# Patient Record
Sex: FEMALE | Race: WHITE
[De-identification: names, ages, dates, MRNs, and addresses within clinical notes are randomized per-mention and may not be internally consistent; named-entity substitution may affect disease eponyms.]

---

## 2018-12-21 ENCOUNTER — HOSPITAL ENCOUNTER (EMERGENCY)
Dept: HOSPITAL 59 - ER | Age: 63
Discharge: HOME | End: 2018-12-21
Payer: COMMERCIAL

## 2018-12-21 DIAGNOSIS — R11.2: Primary | ICD-10-CM

## 2018-12-21 DIAGNOSIS — R19.7: ICD-10-CM

## 2018-12-21 LAB
ALBUMIN SERPL-MCNC: 4.2 G/DL (ref 4–5)
ALBUMIN/GLOB SERPL: 1.6 {RATIO} (ref 1.1–1.8)
ALP SERPL-CCNC: 130 U/L (ref 35–104)
ALT SERPL-CCNC: 25 U/L (ref ?–33)
ANION GAP SERPL CALC-SCNC: 13 MMOL/L (ref 7–16)
AST SERPL-CCNC: 46 U/L (ref 10–35)
BASOPHILS NFR BLD: 0 % (ref 0–6)
BILIRUB SERPL-MCNC: 0.4 MG/DL (ref 0.2–1)
BUN SERPL-MCNC: 7 MG/DL (ref 8–23)
CO2 SERPL-SCNC: 27 MMOL/L (ref 22–29)
CREAT SERPL-MCNC: 0.7 MG/DL (ref 0.5–0.9)
EOSINOPHIL NFR BLD: 0 % (ref 0–6)
ERYTHROCYTE [DISTWIDTH] IN BLOOD BY AUTOMATED COUNT: 13.8 % (ref 11.5–14.5)
EST GLOMERULAR FILTRATION RATE: > 60 ML/MIN
GLOBULIN SER-MCNC: 2.6 GM/DL (ref 1.4–4.8)
GLUCOSE SERPL-MCNC: 99 MG/DL (ref 74–109)
HCT VFR BLD CALC: 41.5 % (ref 35–47)
HGB BLD-MCNC: 13.4 GM/DL (ref 11.6–16)
LIPASE SERPL-CCNC: 45 U/L (ref 13–60)
LYMPHOCYTES NFR BLD: 12 % (ref 16–45)
MCH RBC QN AUTO: 28.3 PG (ref 27–33)
MCHC RBC AUTO-ENTMCNC: 32.3 G/DL (ref 32–36)
MCV RBC AUTO: 87.7 FL (ref 81–97)
MONOCYTES NFR BLD: 2 % (ref 0–9)
NEUTROPHILS NFR BLD AUTO: 86 % (ref 47–80)
NEUTS BAND NFR BLD: 0 % (ref 0–5)
PLATELET # BLD: 221 K/UL (ref 130–400)
PMV BLD AUTO: 10 FL (ref 7.4–10.4)
PROT SERPL-MCNC: 6.8 G/DL (ref 6.6–8.7)
RBC # BLD AUTO: 4.73 M/UL (ref 3.8–5.4)
WBC # BLD AUTO: 7.6 K/UL (ref 4.2–12.2)

## 2018-12-21 PROCEDURE — 96374 THER/PROPH/DIAG INJ IV PUSH: CPT

## 2018-12-21 PROCEDURE — 96361 HYDRATE IV INFUSION ADD-ON: CPT

## 2018-12-21 PROCEDURE — 99284 EMERGENCY DEPT VISIT MOD MDM: CPT

## 2018-12-21 PROCEDURE — 83690 ASSAY OF LIPASE: CPT

## 2018-12-21 PROCEDURE — 80053 COMPREHEN METABOLIC PANEL: CPT

## 2018-12-21 PROCEDURE — 96376 TX/PRO/DX INJ SAME DRUG ADON: CPT

## 2018-12-21 PROCEDURE — 85027 COMPLETE CBC AUTOMATED: CPT

## 2018-12-21 NOTE — EMERGENCY DEPARTMENT RECORD
History of Present Illness





- General


Chief complaint: Nausea, Vomiting, Diarrhea


Stated complaint: N/V/D


Time Seen by Provider: 18 16:42


Source: Patient, Family


Mode of Arrival: Ambulatory


Limitations: No limitations





- History of Present Illness


Initial comments: 


64 yo female presents with 14 hours of diarrhea, nausea and vomiting.  The 

onset was diarrhea at 3am.  She reports numerous non bloody stools.  She took 

Immodium with some slowing of the diarrhea.  She became nauseated and began 

vomiting.  No blood in the emesis.  She has tried small amounts of fluid 

throughout the day without much success.  Two weeks ago she was on antibiotics 

for a UTI.  She was asymptomatic the last several days until 3am.  She has a 

history of gastric bypass and cholecystectomy.  Dr Avendano is her PCP.  





MD complaint: Diarrhea, Nausea, Vomiting


Onset/Timin


-: Hour(s) (14)


Description of Vomiting: Bilious


Description of Diarrhea: Water


Associated Abdominal Pain: No


Improves with: None


Worsens with: None


Associated Symptoms: Fever/chills, Nausea/vomiting





- Related Data


 Previous Rx's











 Medication  Instructions  Recorded


 


Ondansetron [Zofran Odt] 4 mg PO Q8H #20 tab.rapdis 18











 Allergies











Allergy/AdvReac Type Severity Reaction Status Date / Time


 


erythromycin base AdvReac  NAUSEA Verified 16 01:09


 


hydrocodone AdvReac  NAUSEA Verified 18 22:56














Travel Screening





- Travel/Exposure Within Last 30 Days


Have you traveled within the last 30 days?: No





- Travel/Exposure Within Last Year


Have you traveled outside the [place] in the last year?: No





- Additonal Travel Details


Have you been exposed to anyone with a communicable illness?: No





- Travel Symptoms


Symptom Screening: None





Review of Systems


Constitutional: Reports: Chills, Malaise, Weakness


Eyes: Denies: Eye discharge, Eye pain, Vision change


ENT: Denies: Congestion, Throat pain


Respiratory: Denies: Cough, Dyspnea


Cardiovascular: Denies: Chest pain, Palpitations, Syncope


Endocrine: Reports: Fatigue


Gastrointestinal: Reports: Diarrhea, Nausea, Vomiting


Genitourinary: Denies: Dysuria


Musculoskeletal: Denies: Arthralgia, Back pain, Myalgia


Skin: Denies: Bruising, Change in color, Rash


Neurological: Denies: Confusion, Headache


Psychiatric: Denies: Anxiety


Hematological/Lymphatic: Denies: Blood Clots, Easy bleeding, Easy bruising, 

Swollen glands





Past Medical History





- SOCIAL HISTORY


Smoking Status: Never smoker


Alcohol Use: None


Drug Use: None





- RESPIRATORY


Hx Respiratory Disorders: No





- CARDIOVASCULAR


Hx Cardio Disorders: Yes


Hx Hypotension: Yes (orthostatic)





- NEURO


Hx Neuro Disorders: No


Comment:: Raynaud's; Sjogren's syndromew/keratoconjunctivitis sicca





- GI


Hx GI Disorders: Yes


Hx Ulcer: Yes (hx of duodenol)





- 


Hx Genitourinary Disorders: No


Hx Bladder Problem: Yes (OAB)





- ENDOCRINE


Hx Endocrine Disorders: Yes


Hx Thyroid Disease: Yes (hypothyroid)





- MUSCULOSKELETAL


Hx Musculoskeletal Disorders: No


Hx Arthritis: Yes (Osteo)


Comment:: Plantar fasciitis; Hyperkeratosis





- PSYCH


Hx Psych Problems: No





- HEMATOLOGY/ONCOLOGY


Hx Hematology/Oncology Disorders: No





Family Medical History


Any Significant Family History?: Yes


Hx Cancer: Father


Hx Heart Disease: Mother, Brother/Sister





Physical Exam





- General


General Appearance: Alert, Oriented x3, Cooperative, No acute distress


Limitations: No limitations





- Head


Head exam: Atraumatic, Normal inspection





- Eye


Eye exam: Normal appearance, PERRL.  negative: Conjunctival injection, Scleral 

icterus





- ENT


ENT exam: Normal exam, Mucous membranes moist


Ear exam: Normal external inspection


Nasal Exam: Normal inspection


Mouth exam: Normal external inspection





- Neck


Neck exam: Normal inspection





- Respiratory


Respiratory exam: Normal lung sounds bilaterally.  negative: Respiratory 

distress





- Cardiovascular


Cardiovascular Exam: Regular rate, Normal rhythm, Normal heart sounds





- GI/Abdominal


GI/Abdominal exam: Soft, Normal bowel sounds, Other (soft flat abdomen).  

negative: Distended, Guarding, Rebound, Rigid, Tenderness





- Rectal


Rectal exam: Deferred





- 


 exam: Deferred





- Extremities


Extremities exam: Normal inspection





- Back


Back exam: Denies: CVA tenderness (R), CVA tenderness (L)





- Neurological


Neurological exam: Alert, Oriented X3





- Psychiatric


Psychiatric exam: Normal affect, Normal mood.  negative: Agitated, Anxious





- Skin


Skin exam: Dry, Intact, Normal color, Warm





Course





 Vital Signs











  18





  16:29


 


Temperature 99.2 F


 


Pulse Rate 89


 


Respiratory 20





Rate 


 


Blood Pressure 145/79


 


Pulse Ox 98














- Reevaluation(s)


Reevaluation #1: 


Vitals reviewed.  No significant abnormalities.


18 16:54





18 17:29


The labs were reviewed.  No acute changes on the CBC or CMP


18 18:01


No vomiting or diarrhea to this point.  The patient is resting comfortably 

getting IVF.


18 18:53


Doing well at DC with no vomiting or diarrhea


We discussed home care, reasons to return, and outpatient lab slip for stool 

studies was provided if diarrhea continues


At DC no vomiting, diarrhea, fever, abdominal pain.











Medical Decision Making





- Lab Data


Result diagrams: 


 18 17:00





 18 17:00





Disposition


Disposition: Discharge


Clinical Impression: 


 Nausea vomiting and diarrhea





Disposition: Home, Self-Care


Condition: (1) Good


Instructions:  Acute Nausea and Vomiting (ED), Acute Diarrhea (ED)


Additional Instructions: 


Return if you have a return of uncontrolled vomiting or diarrhea, fever, 

abdominal pain or new concerns


Take the Zofran every 4-6 hours if needed for nausea


Goltry diet the next 2 days


Attempt to stay hydrated with small frequent amounts of fluids


Prescriptions: 


Ondansetron [Zofran Odt] 4 mg PO Q8H #20 tab.rapdis


Forms:  Patient Portal Access


Time of Disposition: 18:53





Quality





- Quality Measures


Quality Measures: N/A





- Blood Pressure Screening


Does Patient Have Any of the Following: No


Blood Pressure Classification: Hypertensive Reading


Systolic Measurement: 145


Diastolic Measurement: 79


Screening for High Blood Pressure: < Pre-Hypertensive BP, F/U Documented > [

]


Pre-Hypertensive Follow-up Interventions: Referral to alternative/primary care 

provider.

## 2018-12-23 ENCOUNTER — HOSPITAL ENCOUNTER (EMERGENCY)
Dept: HOSPITAL 59 - ER | Age: 63
Discharge: ANOTHER HOSPITAL | End: 2018-12-23
Payer: COMMERCIAL

## 2018-12-23 DIAGNOSIS — R19.7: ICD-10-CM

## 2018-12-23 DIAGNOSIS — R11.2: Primary | ICD-10-CM

## 2018-12-23 DIAGNOSIS — Z98.84: ICD-10-CM

## 2018-12-23 LAB
ALBUMIN SERPL-MCNC: 3.5 G/DL (ref 4–5)
ALBUMIN/GLOB SERPL: 1.3 {RATIO} (ref 1.1–1.8)
ALP SERPL-CCNC: 160 U/L (ref 35–104)
ALT SERPL-CCNC: 32 U/L (ref ?–33)
ANION GAP SERPL CALC-SCNC: 13 MMOL/L (ref 7–16)
APPEARANCE UR: CLEAR
AST SERPL-CCNC: 34 U/L (ref 10–35)
BASOPHILS NFR BLD: 0.8 % (ref 0–6)
BILIRUB SERPL-MCNC: 0.4 MG/DL (ref 0.2–1)
BILIRUB UR-MCNC: NEGATIVE MG/DL
BUN SERPL-MCNC: 6 MG/DL (ref 8–23)
CO2 SERPL-SCNC: 26 MMOL/L (ref 22–29)
COLOR UR: YELLOW
CREAT SERPL-MCNC: 0.5 MG/DL (ref 0.5–0.9)
EOSINOPHIL NFR BLD: 3.7 % (ref 0–6)
ERYTHROCYTE [DISTWIDTH] IN BLOOD BY AUTOMATED COUNT: 13.8 % (ref 11.5–14.5)
EST GLOMERULAR FILTRATION RATE: > 60 ML/MIN
GLOBULIN SER-MCNC: 2.6 GM/DL (ref 1.4–4.8)
GLUCOSE SERPL-MCNC: 105 MG/DL (ref 74–109)
GLUCOSE UR STRIP-MCNC: NEGATIVE MG/DL
GRANULOCYTES NFR BLD: 64.2 % (ref 47–80)
HCT VFR BLD CALC: 39.2 % (ref 35–47)
HGB BLD-MCNC: 12.7 GM/DL (ref 11.6–16)
KETONES UR QL STRIP: (no result)
LIPASE SERPL-CCNC: 20 U/L (ref 13–60)
LYMPHOCYTES NFR BLD AUTO: 17 % (ref 16–45)
MCH RBC QN AUTO: 27.9 PG (ref 27–33)
MCHC RBC AUTO-ENTMCNC: 32.4 G/DL (ref 32–36)
MCV RBC AUTO: 86.2 FL (ref 81–97)
MONOCYTES NFR BLD: 14.3 % (ref 0–9)
NITRITE UR QL STRIP: NEGATIVE
PLATELET # BLD: 217 K/UL (ref 130–400)
PMV BLD AUTO: 9.9 FL (ref 7.4–10.4)
PROT SERPL-MCNC: 6.1 G/DL (ref 6.6–8.7)
PROT UR QL STRIP: NEGATIVE
RBC # BLD AUTO: 4.55 M/UL (ref 3.8–5.4)
RBC # UR STRIP: (no result) /UL
URINE LEUKOCYTE ESTERASE: NEGATIVE
UROBILINOGEN UR STRIP-ACNC: 0.2 E.U./DL (ref 0.2–1)
WBC # BLD AUTO: 4.8 K/UL (ref 4.2–12.2)

## 2018-12-23 PROCEDURE — 96361 HYDRATE IV INFUSION ADD-ON: CPT

## 2018-12-23 PROCEDURE — 80053 COMPREHEN METABOLIC PANEL: CPT

## 2018-12-23 PROCEDURE — 86140 C-REACTIVE PROTEIN: CPT

## 2018-12-23 PROCEDURE — 85025 COMPLETE CBC W/AUTO DIFF WBC: CPT

## 2018-12-23 PROCEDURE — 96374 THER/PROPH/DIAG INJ IV PUSH: CPT

## 2018-12-23 PROCEDURE — 81003 URINALYSIS AUTO W/O SCOPE: CPT

## 2018-12-23 PROCEDURE — 83690 ASSAY OF LIPASE: CPT

## 2018-12-23 PROCEDURE — 99285 EMERGENCY DEPT VISIT HI MDM: CPT

## 2018-12-23 NOTE — EMERGENCY DEPARTMENT RECORD
History of Present Illness





- General


Chief complaint: Nausea, Vomiting, Diarrhea


Stated complaint: VOMITING,DIARRHEA


Time Seen by Provider: 12/23/18 20:45


Source: Patient, Family


Mode of Arrival: Ambulatory


Limitations: No limitations





- History of Present Illness


Initial comments: 


The patient is here due to a 3 day hx of frequent nausea, vomiting and loose 

watery stools. She denies any AP but is having mild abdominal cramping. The 

patient denies any blood in the vomit or stool but she did have a low grade 

fever tonight. She was in the ER 2 days ago for the same issues and did have 

stool studies yesterday which were neg. The patient has had gastric bypass 12 

years ago.





MD complaint: Diarrhea, Nausea, Vomiting


Onset/Timing: 3


-: Days(s)


Description of Diarrhea: Water


Associated Abdominal Pain: Yes


Location: Diffuse


Severity scale (1-10): 4


Quality: Aching, Cramping


Consistency: Constant


Worsens with: Vomiting


Associated Symptoms: Denies other symptoms





- Related Data


 Previous Rx's











 Medication  Instructions  Recorded


 


Ondansetron [Zofran Odt] 4 mg PO Q8H #20 tab.rapdis 12/21/18











 Allergies











Allergy/AdvReac Type Severity Reaction Status Date / Time


 


erythromycin base AdvReac  NAUSEA Verified 12/03/16 01:09


 


hydrocodone AdvReac  NAUSEA Verified 04/04/18 22:56














Travel Screening





- Travel/Exposure Within Last 30 Days


Have you traveled within the last 30 days?: No





- Travel Symptoms


Symptom Screening: None





Review of Systems


Constitutional: Denies: Chills, Fever


Eyes: Denies: Eye discharge


ENT: Denies: Congestion


Respiratory: Denies: Cough, Dyspnea


Cardiovascular: Denies: Arrhythmia


Endocrine: Denies: Fatigue


Gastrointestinal: Reports: Diarrhea, Nausea, Vomiting


Genitourinary: Denies: Dysuria


Musculoskeletal: Denies: Arthralgia


Skin: Denies: Bruising





Past Medical History





- SOCIAL HISTORY


Smoking Status: Never smoker





- RESPIRATORY


Hx Respiratory Disorders: No





- CARDIOVASCULAR


Hx Cardio Disorders: Yes


Hx Hypotension: Yes (orthostatic)





- NEURO


Hx Neuro Disorders: Yes


Comment:: Raynaud's; Sjogren's syndromew/keratoconjunctivitis sicca





- GI


Hx GI Disorders: Yes


Hx Reflux: Yes


Hx Ulcer: Yes (hx of duodenol)





- 


Hx Genitourinary Disorders: Yes


Hx Bladder Problem: Yes (OAB)





- ENDOCRINE


Hx Endocrine Disorders: Yes


Hx Thyroid Disease: Yes (hypothyroid)





- MUSCULOSKELETAL


Hx Musculoskeletal Disorders: Yes


Hx Arthritis: Yes (Osteo)


Comment:: Plantar fasciitis; Hyperkeratosis





- PSYCH


Hx Psych Problems: No





- HEMATOLOGY/ONCOLOGY


Hx Hematology/Oncology Disorders: No





Family Medical History


Any Significant Family History?: Yes


Hx Cancer: Father


Hx Heart Disease: Mother, Brother/Sister





Physical Exam





- General


General Appearance: Alert, Oriented x3, Cooperative, No acute distress





- Head


Head exam: Atraumatic, Normocephalic, Normal inspection





- Eye


Eye exam: Normal appearance, PERRL, EOMI





- ENT


Throat exam: Normal inspection.  negative: Tonsillar erythema, Tonsillar exudate





- Neck


Neck exam: Normal inspection, Full ROM.  negative: Tenderness





- Respiratory


Respiratory exam: Normal lung sounds bilaterally.  negative: Respiratory 

distress





- Cardiovascular


Cardiovascular Exam: Regular rate, Normal rhythm, Normal heart sounds





- GI/Abdominal


GI/Abdominal exam: Soft, Normal bowel sounds.  negative: Distended, Rebound, 

Rigid, Tenderness (The abdomen is very soft on palpation.)





- Extremities


Extremities exam: Normal inspection, Full ROM, Normal capillary refill.  

negative: Tenderness





- Back


Back exam: Reports: Normal inspection





- Neurological


Neurological exam: Alert, Normal gait.  negative: Abnormal gait, Motor sensory 

deficit





- Psychiatric


Psychiatric exam: negative: Anxious





Course





 Vital Signs











  12/23/18





  20:50


 


Temperature 98.2 F


 


Pulse Rate [ 100 H





Pulse Ox Probe] 


 


Respiratory 20





Rate 


 


Blood Pressure 126/92





[Left Arm] 


 


Pulse Ox 96














- Reevaluation(s)


Reevaluation #1: 


The patient is doing better at this time. She has no nausea or belching or AP 

presently but has had intermittent watery diarrhea while in the ED. On exam her 

abdomen is very soft and nontender in all 4 quads. I did discuss the need for 

further evaluation and since we have no GI doctors here at [facility] I did recommend 

transfer to a larger hospital and since the patient see's Dr. Avendano she would 

like to go to [facility]. I did discuss the case with Dr. Rodriguez at [facility] and he does 

accept the patient in transfer.


12/23/18 22:29








Medical Decision Making





- Data Complexity


MDM Data: Labs Ordered and/or Reviewed





- Lab Data


Result diagrams: 


 12/23/18 21:27





 12/23/18 21:27





Disposition


Disposition: Transfer


Clinical Impression: 


 Nausea vomiting and diarrhea





Disposition: Acute Care Hospital Transfer


Transfer To: [facility]


Reason For Transfer: Gastroenterology


Accepting Physician: Michael


Time Discussed w/Accepting Physician: 22:31


Condition: (2) Stable


Forms:  Patient Portal Access


Time of Disposition: 22:31





Quality





- Quality Measures


Quality Measures: N/A





- Blood Pressure Screening


View Details: Yes


Does Patient Have Any of the Following: No


Blood Pressure Classification: Pre-Hypertensive BP Reading


Systolic Measurement: 126


Diastolic Measurement: 69


Screening for High Blood Pressure: < Pre-Hypertensive BP, F/U Documented > [

]


Pre-Hypertensive Follow-up Interventions: Referral to alternative/primary care 

provider.

## 2020-02-14 ENCOUNTER — HOSPITAL ENCOUNTER (EMERGENCY)
Dept: HOSPITAL 59 - ER | Age: 65
Discharge: HOME | End: 2020-02-14
Payer: COMMERCIAL

## 2020-02-14 DIAGNOSIS — I10: ICD-10-CM

## 2020-02-14 DIAGNOSIS — J11.2: Primary | ICD-10-CM

## 2020-02-14 LAB
ABSOLUTE NEUTROPHIL COUNT: 4.13
ALBUMIN SERPL-MCNC: 4.2 G/DL (ref 4–5)
ALBUMIN/GLOB SERPL: 1.7 {RATIO} (ref 1.1–1.8)
ALP SERPL-CCNC: 75 U/L (ref 35–104)
ALT SERPL-CCNC: 20 U/L (ref ?–33)
ANION GAP SERPL CALC-SCNC: 15 MMOL/L (ref 7–16)
AST SERPL-CCNC: 27 U/L (ref 10–35)
BILIRUB SERPL-MCNC: 0.4 MG/DL (ref 0.2–1)
BUN SERPL-MCNC: 8 MG/DL (ref 8–23)
CO2 SERPL-SCNC: 24 MMOL/L (ref 22–29)
CREAT SERPL-MCNC: 0.5 MG/DL (ref 0.5–0.9)
ERYTHROCYTE [DISTWIDTH] IN BLOOD BY AUTOMATED COUNT: 13.3 % (ref 11.5–14.5)
EST GLOMERULAR FILTRATION RATE: > 60 ML/MIN
GLOBULIN SER-MCNC: 2.5 GM/DL (ref 1.4–4.8)
GLUCOSE SERPL-MCNC: 110 MG/DL (ref 74–109)
HCT VFR BLD CALC: 39.6 % (ref 35–47)
HGB BLD-MCNC: 12.4 GM/DL (ref 11.6–16)
LYMPHOCYTES NFR BLD: 6 % (ref 16–45)
MCH RBC QN AUTO: 27.5 PG (ref 27–33)
MCHC RBC AUTO-ENTMCNC: 31.3 G/DL (ref 32–36)
MCV RBC AUTO: 87.8 FL (ref 81–97)
MONOCYTES NFR BLD: 6 % (ref 0–9)
NEUTROPHILS NFR BLD AUTO: 87 % (ref 47–80)
NEUTS BAND NFR BLD: 1 % (ref 0–5)
PLATELET # BLD EST: NORMAL 10*3/UL
PLATELET # BLD: 185 K/UL (ref 130–400)
PMV BLD AUTO: 9.6 FL (ref 7.4–10.4)
PROT SERPL-MCNC: 6.7 G/DL (ref 6.6–8.7)
RBC # BLD AUTO: 4.51 M/UL (ref 3.8–5.4)
RBC MORPH BLD: NORMAL
WBC # BLD AUTO: 4.9 K/UL (ref 4.2–12.2)

## 2020-02-14 PROCEDURE — 96375 TX/PRO/DX INJ NEW DRUG ADDON: CPT

## 2020-02-14 PROCEDURE — 99284 EMERGENCY DEPT VISIT MOD MDM: CPT

## 2020-02-14 PROCEDURE — 71046 X-RAY EXAM CHEST 2 VIEWS: CPT

## 2020-02-14 PROCEDURE — 96361 HYDRATE IV INFUSION ADD-ON: CPT

## 2020-02-14 PROCEDURE — 85027 COMPLETE CBC AUTOMATED: CPT

## 2020-02-14 PROCEDURE — 80053 COMPREHEN METABOLIC PANEL: CPT

## 2020-02-14 PROCEDURE — 96365 THER/PROPH/DIAG IV INF INIT: CPT

## 2020-02-14 NOTE — EMERGENCY DEPARTMENT RECORD
History of Present Illness





- General


Chief complaint: Flu Like Symptoms


Stated complaint: FLU A /VOMITING


Time Seen by Provider: 20 17:27


Source: Patient


Mode of Arrival: Ambulatory


Limitations: No limitations





- History of Present Illness


Initial comments: 





63 yo female presents not feeling well for about one day.  She has cough, fever,

body aches, headache, weakness.  She saw her doctor and tested positive for 

Influenza A.  She started Tamiflu this morning.  She has developed nausea and 

vomiting since taking the Tamiflu.  She denies any blood in the vomit.  She is a

non smoker.  Her cough feels productive.  Her doctor also thought she had a 

sinus infection so she started her on an antibiotic as well.  No diarrhea.  No 

abdominal pain.  


MD Complaint: Generalized weakness


Onset/Timin


-: Days(s)


Location: Generalized


Severity: Moderate


Severity scale (1-10): 9


Quality: Aching


Consistency: Constant


Improves with: None


Worsens with: None


Context: Recent illness





- Related Data


                                  Previous Rx's











 Medication  Instructions  Recorded


 


Ondansetron [Zofran Odt] 4 mg PO Q8H #15 tab.rapdis 20











                                    Allergies











Allergy/AdvReac Type Severity Reaction Status Date / Time


 


erythromycin base AdvReac  NAUSEA Verified 20 17:23


 


hydrocodone AdvReac  NAUSEA Verified 20 17:23














Travel/Exposure Screening





- Travel/Exposure Within Last 30 Days


Have you traveled within the last 30 days?: No





- Travel/Exposure Within Last Year


Have you traveled outside the [place] in the last year?: No





- Additonal Travel/Exposure Details


Have you been exposed to anyone with a communicable illness?: No





- Travel Symptoms


Symptom Screening: None





Review of Systems


Constitutional: Reports: Chills, Fever, Malaise, Weakness


Eyes: Denies: Eye discharge, Photophobia, Vision change


ENT: Reports: Congestion, Throat pain.  Denies: Ear pain, Epistaxis


Respiratory: Reports: Cough.  Denies: Dyspnea, Hemoptysis, Wheezes


Cardiovascular: Reports: Chest pain (with coughing).  Denies: Edema, Palpit

ations, Syncope


Endocrine: Reports: Fatigue.  Denies: Polydipsia, Polyuria


Gastrointestinal: Reports: Nausea, Vomiting.  Denies: Abdominal pain, Const

ipation, Diarrhea, Hematemesis, Hematochezia, Melena


Genitourinary: Denies: Dysuria, Urgency


Musculoskeletal: Reports: Myalgia.  Denies: Arthralgia, Back pain


Skin: Denies: Bruising, Change in color, Rash


Neurological: Reports: Headache.  Denies: Numbness, Vertigo, Weakness


Psychiatric: Denies: Anxiety


Hematological/Lymphatic: Denies: Easy bleeding, Easy bruising





Past Medical History





- SOCIAL HISTORY


Smoking Status: Never smoker


Alcohol Use: None


Drug Use: None





- RESPIRATORY


Hx Respiratory Disorders: No





- CARDIOVASCULAR


Hx Cardio Disorders: Yes


Hx Hypotension: Yes (orthostatic)





- NEURO


Hx Neuro Disorders: Yes


Comment:: Raynaud's; Sjogren's syndromew/keratoconjunctivitis sicca





- GI


Hx GI Disorders: Yes


Hx Reflux: Yes


Hx Ulcer: Yes (hx of duodenol)





- 


Hx Genitourinary Disorders: Yes


Hx Bladder Problem: Yes (OAB)





- ENDOCRINE


Hx Endocrine Disorders: Yes


Hx Thyroid Disease: Yes (hypothyroid)





- MUSCULOSKELETAL


Hx Musculoskeletal Disorders: Yes


Hx Arthritis: Yes (Osteo)


Comment:: Plantar fasciitis; Hyperkeratosis





- PSYCH


Hx Psych Problems: No





- HEMATOLOGY/ONCOLOGY


Hx Hematology/Oncology Disorders: No





Family Medical History


Any Significant Family History?: Yes


Hx Cancer: Father


Hx Heart Disease: Mother, Brother/Sister





Physical Exam





- General


General Appearance: Alert, Oriented x3, Cooperative, No acute distress


Limitations: No limitations





- Head


Head exam: Atraumatic, Normal inspection





- Eye


Eye exam: Normal appearance, PERRL.  negative: Conjunctival injection, Scleral 

icterus





- ENT


ENT exam: Normal exam, Mucous membranes moist


Ear exam: Normal external inspection


Nasal Exam: Normal inspection


Mouth exam: Normal external inspection





- Neck


Neck exam: Normal inspection.  negative: Lymphadenopathy, Meningismus, 

Tenderness





- Respiratory


Respiratory exam: Normal lung sounds bilaterally.  negative: Accessory muscle 

use, Prolonged expiratory, Rhonchi, Stridor, Wheezes





- Cardiovascular


Cardiovascular Exam: Regular rate, Normal rhythm, Normal heart sounds





- GI/Abdominal


GI/Abdominal exam: Soft.  negative: Tenderness





- Rectal


Rectal exam: Deferred





- 


 exam: Deferred





- Extremities


Extremities exam: Normal inspection.  negative: Tenderness





- Back


Back exam: Denies: CVA tenderness (R), CVA tenderness (L), Rash noted, 

Tenderness





- Neurological


Neurological exam: Alert, Normal gait, Oriented X3.  negative: Abnormal gait, 

Altered





- Psychiatric


Psychiatric exam: Normal affect, Normal mood





- Skin


Skin exam: Dry, Intact, Normal color, Warm





Course





                                   Vital Signs











  20





  17:26


 


Temperature 98.7 F


 


Pulse Rate 83


 


Respiratory 20





Rate 


 


Blood Pressure 172/85


 


Pulse Ox 100














- Reevaluation(s)


Reevaluation #1: 


Vitals were reviewed


No significant abnormalities


20 18:11





20 18:28


The labs results were reviewed


There are no acute significant abnormalities of the CBC


There are no acute significant abnormalities of the CMP


20 18:30





The patient is doing better after Zofran with controlled nausea


The plan is to continue to hydrate with likely DC home after a second liter.





Medical Decision Making





- Lab Data


Result diagrams: 


                                 20 17:45





                                 20 17:45





Disposition


Disposition: Discharge


Clinical Impression: 


 Influenza A





Nausea and vomiting


Qualifiers:


 Vomiting type: unspecified Vomiting Intractability: unspecified Qualified 

Code(s): R11.2 - Nausea with vomiting, unspecified





Disposition: Home, Self-Care


Condition: (1) Good


Instructions:  Influenza (ED)


Additional Instructions: 


Review this ER visit and the tests performed with your family doctor


Call your doctor for the next available follow up appointment


Return to the ER for a recheck immediately if worse, any new concerns or questio

ns


Take the prescriptions provided as directed


Prescriptions: 


Ondansetron [Zofran Odt] 4 mg PO Q8H #15 tab.rapdis


Forms:  Patient Portal Access


Time of Disposition: 08:10





Quality





- Quality Measures


Quality Measures: N/A





- Blood Pressure Screening


Does Patient Have Any of the Following: No


Blood Pressure Classification: Hypertensive Reading


Systolic Measurement: 143


Diastolic Measurement: 74


Screening for High Blood Pressure: < Pre-Hypertensive BP, F/U Documented > 

[]


Pre-Hypertensive Follow-up Interventions: Referral to alternative/primary care 

provider.

## 2020-02-14 NOTE — EMERGENCY DEPARTMENT RECORD
History of Present Illness





- General


Chief complaint: Flu Like Symptoms


Stated complaint: FLU A /VOMITING


Time Seen by Provider: 20 17:27


Source: Patient


Mode of Arrival: Ambulatory


Limitations: No limitations





- History of Present Illness


MD Complaint: Generalized weakness


Onset/Timin


-: Days(s)


Location: Generalized


Severity: Moderate


Severity scale (1-10): 9


Quality: Aching


Consistency: Constant


Improves with: None


Worsens with: None


Context: Recent illness





- Related Data


                                  Previous Rx's











 Medication  Instructions  Recorded


 


Ondansetron [Zofran Odt] 4 mg PO Q8H #15 tab.rapdis 20











                                    Allergies











Allergy/AdvReac Type Severity Reaction Status Date / Time


 


erythromycin base AdvReac  NAUSEA Verified 20 17:23


 


hydrocodone AdvReac  NAUSEA Verified 20 17:23














Travel/Exposure Screening





- Travel/Exposure Within Last 30 Days


Have you traveled within the last 30 days?: No





- Travel/Exposure Within Last Year


Have you traveled outside the [place] in the last year?: No





- Additonal Travel/Exposure Details


Have you been exposed to anyone with a communicable illness?: No





- Travel Symptoms


Symptom Screening: None





Review of Systems


Constitutional: Reports: Chills, Fever, Malaise, Weakness


Eyes: Denies: Eye discharge, Photophobia, Vision change


ENT: Reports: Congestion, Throat pain.  Denies: Ear pain, Epistaxis


Respiratory: Reports: Cough.  Denies: Dyspnea, Hemoptysis, Wheezes


Cardiovascular: Reports: Chest pain (with coughing).  Denies: Edema, 

Palpitations, Syncope


Endocrine: Reports: Fatigue.  Denies: Polydipsia, Polyuria


Gastrointestinal: Reports: Nausea, Vomiting.  Denies: Abdominal pain, 

Constipation, Diarrhea, Hematemesis, Hematochezia, Melena


Genitourinary: Denies: Dysuria, Urgency


Musculoskeletal: Reports: Myalgia.  Denies: Arthralgia, Back pain


Skin: Denies: Bruising, Change in color, Rash


Neurological: Reports: Headache.  Denies: Numbness, Vertigo, Weakness


Psychiatric: Denies: Anxiety


Hematological/Lymphatic: Denies: Easy bleeding, Easy bruising





Past Medical History





- SOCIAL HISTORY


Smoking Status: Never smoker


Alcohol Use: None


Drug Use: None





- RESPIRATORY


Hx Respiratory Disorders: No





- CARDIOVASCULAR


Hx Cardio Disorders: Yes


Hx Hypotension: Yes (orthostatic)





- NEURO


Hx Neuro Disorders: Yes


Comment:: Raynaud's; Sjogren's syndromew/keratoconjunctivitis sicca





- GI


Hx GI Disorders: Yes


Hx Reflux: Yes


Hx Ulcer: Yes (hx of duodenol)





- 


Hx Genitourinary Disorders: Yes


Hx Bladder Problem: Yes (OAB)





- ENDOCRINE


Hx Endocrine Disorders: Yes


Hx Thyroid Disease: Yes (hypothyroid)





- MUSCULOSKELETAL


Hx Musculoskeletal Disorders: Yes


Hx Arthritis: Yes (Osteo)


Comment:: Plantar fasciitis; Hyperkeratosis





- PSYCH


Hx Psych Problems: No





- HEMATOLOGY/ONCOLOGY


Hx Hematology/Oncology Disorders: No





Family Medical History


Any Significant Family History?: Yes


Hx Cancer: Father


Hx Heart Disease: Mother, Brother/Sister





Physical Exam





- General


Limitations: No limitations





Course





                                   Vital Signs











  20





  17:26


 


Temperature 98.7 F


 


Pulse Rate 83


 


Respiratory 20





Rate 


 


Blood Pressure 172/85


 


Pulse Ox 100














- Reevaluation(s)


Reevaluation #1: 





20 19:31


Patient was reassessed, reports improvement in her overall symptoms (nausea, 

headache).


Patient has ambulated to the bathroom as well.


2nd L NS is currently infusing, will reassess when completed.





20 20:45


Patient was reassessed following completion of her 2nd Liter NS, reports that 

she is feeling much improved, appears stable for discharge at this time.








Medical Decision Making





- Lab Data


Result diagrams: 


                                 20 17:45





                                 20 17:45





                                   Lab Results











  20 Range/Units





  17:45 17:45 


 


WBC  4.9   (4.2-12.2)  K/uL


 


RBC  4.51   (3.80-5.40)  M/uL


 


Hgb  12.4   (11.6-16.0)  gm/dl


 


Hct  39.6   (35.0-47.0)  %


 


MCV  87.8   (81-97)  fl


 


MCH  27.5   (27-33)  pg


 


MCHC  31.3 L   (32-36)  g/dl


 


RDW  13.3   (11.5-14.5)  %


 


Plt Count  185   (130-400)  K/uL


 


MPV  9.6   (7.4-10.4)  fl


 


Neutrophils %  87.0 H   (47-80)  %


 


Band Neutrophils %  1.0   (0-5)  %


 


Eosinophils %  Not Reportable   


 


Basophils %  Not Reportable   


 


Absolute Neutrophils  4.13   


 


Lymphocytes  6.0 L   (16-45)  %


 


Monocytes  6.0   (0-9)  %


 


Platelet Estimate  Normal   (NORMAL)  


 


RBC Morphology  Normal   


 


Sodium   132 L  (136-145)  mmol/L


 


Potassium   4.1  (3.4-4.5)  mmol/L


 


Chloride   93 L  ()  mmol/L


 


Carbon Dioxide   24.0  (22-29)  mmol/L


 


Anion Gap   15.0  (7-16)  


 


BUN   8  (8-23)  mg/dL


 


Creatinine   0.5  (0.5-0.9)  mg/dL


 


Estimated GFR   > 60  mL/min


 


Random Glucose   110 H  ()  mg/dL


 


Calcium   8.6 L  (8.8-10.2)  mg/dL


 


Total Bilirubin   0.40  (0.2-1.0)  mg/dL


 


AST   27  (10.0-35.0)  U/L


 


ALT   20  (<33)  U/L


 


Alkaline Phosphatase   75  ()  U/L


 


Total Protein   6.7  (6.6-8.7)  g/dL


 


Albumin   4.2  (4.0-5.0)  g/dL


 


Globulin   2.5  (1.4-4.8)  gm/dL


 


Albumin/Globulin Ratio   1.7  (1.1-1.8)  














Disposition


Disposition: Discharge


Clinical Impression: 


 Influenza A





Nausea and vomiting


Qualifiers:


 Vomiting type: unspecified Vomiting Intractability: unspecified Qualified Co

de(s): R11.2 - Nausea with vomiting, unspecified





Disposition: Home, Self-Care


Condition: (1) Good


Instructions:  Influenza (ED)


Additional Instructions: 


Review this ER visit and the tests performed with your family doctor


Call your doctor for the next available follow up appointment


Return to the ER for a recheck immediately if worse, any new concerns or 

questions


Take the prescriptions provided as directed


Prescriptions: 


Ondansetron [Zofran Odt] 4 mg PO Q8H #15 tab.rapdis


Forms:  Patient Portal Access





Quality





- Quality Measures


Quality Measures: N/A





- Blood Pressure Screening


Does Patient Have Any of the Following: Active Dx of HTN


Blood Pressure Classification: Hypertensive Reading


Systolic Measurement: 143


Diastolic Measurement: 74


Screening for High Blood Pressure: Patient Exclusion, Hx of HTN []

## 2020-02-14 NOTE — RADIOLOGY REPORT
EXAMINATION: Two View Chest Radiographs

EXAM DATE:  2/14/2020 6:33 PM



TECHNIQUE:  Frontal and lateral views



INDICATION:  cough, fever

COMPARISON:  Report from chest x-ray April 4, 2018 for which images aren't available on PACS



ENCOUNTER: Not applicable

_________________________



FINDINGS:



The heart, mediastinum, and pulmonary vasculature are normal.   No lung consolidation or pleural effu
sions are present.

_________________________



IMPRESSION:



Negative for active intrathoracic disease



Dictated by: Alden Canchola MD on 2/14/2020 6:40 PM.

Electronically signed by: Alden Canchola MD on 2/14/2020 6:41 PM.